# Patient Record
Sex: MALE | Race: WHITE | NOT HISPANIC OR LATINO | Employment: FULL TIME | URBAN - METROPOLITAN AREA
[De-identification: names, ages, dates, MRNs, and addresses within clinical notes are randomized per-mention and may not be internally consistent; named-entity substitution may affect disease eponyms.]

---

## 2023-12-22 ENCOUNTER — TELEPHONE (OUTPATIENT)
Dept: PRIMARY CARE | Facility: CLINIC | Age: 43
End: 2023-12-22
Payer: COMMERCIAL

## 2023-12-27 NOTE — TELEPHONE ENCOUNTER
Patient has not been seen in over 3 years, so I spoke to his wife and she was going to check and see if he has been being seen elsewhere more recently since he has been taking the levothyroxine.

## 2024-05-15 ENCOUNTER — OFFICE VISIT (OUTPATIENT)
Dept: PRIMARY CARE | Facility: CLINIC | Age: 44
End: 2024-05-15
Payer: COMMERCIAL

## 2024-05-15 VITALS
BODY MASS INDEX: 37.41 KG/M2 | HEART RATE: 74 BPM | WEIGHT: 224.8 LBS | DIASTOLIC BLOOD PRESSURE: 104 MMHG | TEMPERATURE: 98 F | OXYGEN SATURATION: 97 % | SYSTOLIC BLOOD PRESSURE: 160 MMHG

## 2024-05-15 DIAGNOSIS — E66.01 CLASS 2 SEVERE OBESITY DUE TO EXCESS CALORIES WITH SERIOUS COMORBIDITY AND BODY MASS INDEX (BMI) OF 37.0 TO 37.9 IN ADULT (MULTI): ICD-10-CM

## 2024-05-15 DIAGNOSIS — I15.9 SECONDARY HYPERTENSION: ICD-10-CM

## 2024-05-15 DIAGNOSIS — E03.9 HYPOTHYROIDISM, UNSPECIFIED TYPE: ICD-10-CM

## 2024-05-15 DIAGNOSIS — Z00.00 ANNUAL PHYSICAL EXAM: Primary | ICD-10-CM

## 2024-05-15 DIAGNOSIS — F41.9 ANXIETY: ICD-10-CM

## 2024-05-15 DIAGNOSIS — E55.9 VITAMIN D DEFICIENCY: ICD-10-CM

## 2024-05-15 DIAGNOSIS — J30.9 ALLERGIC RHINITIS, UNSPECIFIED SEASONALITY, UNSPECIFIED TRIGGER: ICD-10-CM

## 2024-05-15 PROBLEM — I10 HYPERTENSION: Status: ACTIVE | Noted: 2019-02-11

## 2024-05-15 PROCEDURE — 99386 PREV VISIT NEW AGE 40-64: CPT | Performed by: INTERNAL MEDICINE

## 2024-05-15 PROCEDURE — 3080F DIAST BP >= 90 MM HG: CPT | Performed by: INTERNAL MEDICINE

## 2024-05-15 PROCEDURE — 99214 OFFICE O/P EST MOD 30 MIN: CPT | Performed by: INTERNAL MEDICINE

## 2024-05-15 PROCEDURE — 1036F TOBACCO NON-USER: CPT | Performed by: INTERNAL MEDICINE

## 2024-05-15 PROCEDURE — 3077F SYST BP >= 140 MM HG: CPT | Performed by: INTERNAL MEDICINE

## 2024-05-15 RX ORDER — AMLODIPINE BESYLATE 10 MG/1
10 TABLET ORAL DAILY
Qty: 30 TABLET | Refills: 0 | Status: SHIPPED | OUTPATIENT
Start: 2024-05-15 | End: 2024-05-15

## 2024-05-15 RX ORDER — AMLODIPINE BESYLATE 5 MG/1
5 TABLET ORAL DAILY
COMMUNITY
End: 2024-05-15 | Stop reason: SDUPTHER

## 2024-05-15 RX ORDER — LEVOTHYROXINE SODIUM 25 UG/1
25 TABLET ORAL DAILY
Qty: 90 TABLET | Refills: 0 | Status: SHIPPED | OUTPATIENT
Start: 2024-05-15

## 2024-05-15 RX ORDER — BUPROPION HYDROCHLORIDE 150 MG/1
150 TABLET, EXTENDED RELEASE ORAL 2 TIMES DAILY
Qty: 180 TABLET | Refills: 0 | Status: SHIPPED | OUTPATIENT
Start: 2024-05-15

## 2024-05-15 RX ORDER — BUPROPION HYDROCHLORIDE 150 MG/1
150 TABLET, EXTENDED RELEASE ORAL 2 TIMES DAILY
COMMUNITY
End: 2024-05-15 | Stop reason: SDUPTHER

## 2024-05-15 RX ORDER — FLUTICASONE PROPIONATE 50 MCG
1 SPRAY, SUSPENSION (ML) NASAL DAILY
Qty: 16 G | Refills: 2 | Status: SHIPPED | OUTPATIENT
Start: 2024-05-15 | End: 2024-06-08 | Stop reason: SDUPTHER

## 2024-05-15 RX ORDER — AMLODIPINE BESYLATE 5 MG/1
5 TABLET ORAL DAILY
Qty: 90 TABLET | Refills: 0 | Status: SHIPPED | OUTPATIENT
Start: 2024-05-15 | End: 2024-05-15

## 2024-05-15 RX ORDER — LISINOPRIL 5 MG/1
5 TABLET ORAL DAILY
COMMUNITY
End: 2024-05-15 | Stop reason: SDUPTHER

## 2024-05-15 RX ORDER — AMLODIPINE BESYLATE 10 MG/1
10 TABLET ORAL DAILY
Qty: 90 TABLET | Refills: 0 | Status: SHIPPED | OUTPATIENT
Start: 2024-05-15

## 2024-05-15 RX ORDER — LEVOTHYROXINE SODIUM 25 UG/1
TABLET ORAL
COMMUNITY
Start: 2022-12-22 | End: 2024-05-15 | Stop reason: SDUPTHER

## 2024-05-15 RX ORDER — LISINOPRIL 5 MG/1
5 TABLET ORAL DAILY
Qty: 90 TABLET | Refills: 0 | Status: SHIPPED | OUTPATIENT
Start: 2024-05-15 | End: 2024-05-20 | Stop reason: SDUPTHER

## 2024-05-15 ASSESSMENT — ENCOUNTER SYMPTOMS: HYPERTENSION: 1

## 2024-05-15 ASSESSMENT — PATIENT HEALTH QUESTIONNAIRE - PHQ9
SUM OF ALL RESPONSES TO PHQ9 QUESTIONS 1 AND 2: 0
1. LITTLE INTEREST OR PLEASURE IN DOING THINGS: NOT AT ALL
2. FEELING DOWN, DEPRESSED OR HOPELESS: NOT AT ALL

## 2024-05-15 NOTE — PROGRESS NOTES
Subjective   Patient ID: Francis Herrera is a 44 y.o. male who presents for New Patient Visit, Hypertension, Hypothyroidism, Anxiety, and testosterone (Discuss) / follow up  Hypertension  Associated symptoms include anxiety.   Anxiety        New PCP    Yearly physical    Prostate none  Colonoscopy none  CT chest lung cancer screening none  immunizations rev'd  BMI 37.4    Follow up    Hypertension uncontrolled  Increase amlodipine to 10 mg daily  Follow closely    Hypothyroidism due to Hashimoto's  Continue meds    SHAJI stable on therapy no side effects    Allergic rhinitis stable on therapy no side effects    Diet and exercise reviewed    Review of Systems   All other systems reviewed and are negative.      Objective   BP (!) 160/104   Pulse 74   Temp 36.7 °C (98 °F)   Wt 102 kg (224 lb 12.8 oz)   SpO2 97%   BMI 37.41 kg/m²   Lab Results   Component Value Date    WBC 9.3 02/25/2019    HGB 16.6 (H) 02/25/2019    HCT 48.4 02/25/2019     02/25/2019    CHOL 222 (H) 02/23/2019    TRIG 265 (H) 02/23/2019    HDL 30 (L) 02/23/2019    ALT 57 (H) 02/23/2019    AST 39 02/23/2019     08/05/2019    K 3.8 08/05/2019     08/05/2019    CREATININE 1.3 08/05/2019    BUN 15 08/05/2019    CO2 26 08/05/2019    TSH 4.05 08/05/2019    HGBA1C 5.1 12/17/2022           Physical Exam  Vitals reviewed.   Constitutional:       Appearance: Normal appearance. He is obese.   HENT:      Head: Normocephalic and atraumatic.      Mouth/Throat:      Pharynx: No posterior oropharyngeal erythema.   Eyes:      General: No scleral icterus.     Conjunctiva/sclera: Conjunctivae normal.      Pupils: Pupils are equal, round, and reactive to light.   Cardiovascular:      Rate and Rhythm: Normal rate and regular rhythm.      Heart sounds: Normal heart sounds.   Pulmonary:      Effort: No respiratory distress.      Breath sounds: No wheezing.   Abdominal:      General: Abdomen is flat. Bowel sounds are normal. There is no distension.       Palpations: Abdomen is soft. There is no mass.      Tenderness: There is no abdominal tenderness. There is no rebound.   Musculoskeletal:         General: Normal range of motion.      Cervical back: Normal range of motion and neck supple.   Skin:     General: Skin is warm and dry.   Neurological:      General: No focal deficit present.      Mental Status: He is alert and oriented to person, place, and time. Mental status is at baseline.   Psychiatric:         Mood and Affect: Mood normal.         Behavior: Behavior normal.         Thought Content: Thought content normal.         Judgment: Judgment normal.         Problem List Items Addressed This Visit             ICD-10-CM    Hypothyroid E03.9    Relevant Medications    levothyroxine (Synthroid, Levoxyl) 25 mcg tablet    Hypertension I10    Relevant Medications    lisinopril 5 mg tablet     Other Visit Diagnoses         Codes    Annual physical exam    -  Primary Z00.00    Relevant Orders    CBC and Auto Differential    Comprehensive Metabolic Panel    Lipid Panel    TSH with reflex to Free T4 if abnormal    Anxiety     F41.9    Relevant Medications    buPROPion SR (Wellbutrin SR) 150 mg 12 hr tablet    Vitamin D deficiency     E55.9    Relevant Orders    Vitamin D 25-Hydroxy,Total (for eval of Vitamin D levels)    Allergic rhinitis, unspecified seasonality, unspecified trigger     J30.9    Relevant Medications    fluticasone (Flonase) 50 mcg/actuation nasal spray    Class 2 severe obesity due to excess calories with serious comorbidity and body mass index (BMI) of 37.0 to 37.9 in adult (Multi)     E66.01, Z68.37          Assessment/Plan     New PCP    Yearly physical    Prostate none  Colonoscopy none  CT chest lung cancer screening none  immunizations rev'd  BMI 37.4    Follow up    Hypertension uncontrolled  Increase amlodipine to 10 mg daily  Follow closely    Hypothyroidism due to Hashimoto's  Continue meds    SHAJI stable on therapy no side effects    Allergic  rhinitis stable on therapy no side effects    Diet and exercise reviewed    Check labs    Follow up 3 weeks / EKG

## 2024-05-20 ENCOUNTER — TELEPHONE (OUTPATIENT)
Dept: PRIMARY CARE | Facility: CLINIC | Age: 44
End: 2024-05-20
Payer: COMMERCIAL

## 2024-05-20 DIAGNOSIS — I15.9 SECONDARY HYPERTENSION: ICD-10-CM

## 2024-05-20 NOTE — TELEPHONE ENCOUNTER
Patients wife called and asked if dimas is supposed to take his lisinopril 5 mg with the 10 amlodipine. I saw in the last chart note that it said to increase amlodipine to 10. Please advise.

## 2024-05-21 RX ORDER — LISINOPRIL 5 MG/1
5 TABLET ORAL DAILY
Qty: 30 TABLET | Refills: 0 | Status: SHIPPED | OUTPATIENT
Start: 2024-05-21

## 2024-06-08 DIAGNOSIS — J30.9 ALLERGIC RHINITIS, UNSPECIFIED SEASONALITY, UNSPECIFIED TRIGGER: ICD-10-CM

## 2024-06-08 RX ORDER — FLUTICASONE PROPIONATE 50 MCG
SPRAY, SUSPENSION (ML) NASAL
Qty: 48 ML | Refills: 0 | Status: SHIPPED | OUTPATIENT
Start: 2024-06-08

## 2024-06-11 ENCOUNTER — APPOINTMENT (OUTPATIENT)
Dept: PRIMARY CARE | Facility: CLINIC | Age: 44
End: 2024-06-11
Payer: COMMERCIAL

## 2024-06-14 ENCOUNTER — LAB (OUTPATIENT)
Dept: LAB | Facility: LAB | Age: 44
End: 2024-06-14
Payer: COMMERCIAL

## 2024-06-14 DIAGNOSIS — E55.9 VITAMIN D DEFICIENCY: ICD-10-CM

## 2024-06-14 DIAGNOSIS — Z00.00 ANNUAL PHYSICAL EXAM: ICD-10-CM

## 2024-06-14 LAB
25(OH)D3 SERPL-MCNC: 34 NG/ML (ref 30–100)
ALBUMIN SERPL BCP-MCNC: 4.6 G/DL (ref 3.4–5)
ALP SERPL-CCNC: 68 U/L (ref 33–120)
ALT SERPL W P-5'-P-CCNC: 47 U/L (ref 10–52)
ANION GAP SERPL CALC-SCNC: 11 MMOL/L (ref 10–20)
AST SERPL W P-5'-P-CCNC: 31 U/L (ref 9–39)
BASOPHILS # BLD AUTO: 0.06 X10*3/UL (ref 0–0.1)
BASOPHILS NFR BLD AUTO: 0.7 %
BILIRUB SERPL-MCNC: 0.7 MG/DL (ref 0–1.2)
BUN SERPL-MCNC: 16 MG/DL (ref 6–23)
CALCIUM SERPL-MCNC: 9.3 MG/DL (ref 8.6–10.3)
CHLORIDE SERPL-SCNC: 101 MMOL/L (ref 98–107)
CHOLEST SERPL-MCNC: 258 MG/DL (ref 0–199)
CHOLESTEROL/HDL RATIO: 7.5
CO2 SERPL-SCNC: 31 MMOL/L (ref 21–32)
CREAT SERPL-MCNC: 1.58 MG/DL (ref 0.5–1.3)
EGFRCR SERPLBLD CKD-EPI 2021: 55 ML/MIN/1.73M*2
EOSINOPHIL # BLD AUTO: 0.4 X10*3/UL (ref 0–0.7)
EOSINOPHIL NFR BLD AUTO: 4.5 %
ERYTHROCYTE [DISTWIDTH] IN BLOOD BY AUTOMATED COUNT: 12.8 % (ref 11.5–14.5)
GLUCOSE SERPL-MCNC: 102 MG/DL (ref 74–99)
HCT VFR BLD AUTO: 49.4 % (ref 41–52)
HDLC SERPL-MCNC: 34.5 MG/DL
HGB BLD-MCNC: 16.6 G/DL (ref 13.5–17.5)
IMM GRANULOCYTES # BLD AUTO: 0.04 X10*3/UL (ref 0–0.7)
IMM GRANULOCYTES NFR BLD AUTO: 0.4 % (ref 0–0.9)
LDLC SERPL CALC-MCNC: 189 MG/DL
LYMPHOCYTES # BLD AUTO: 2 X10*3/UL (ref 1.2–4.8)
LYMPHOCYTES NFR BLD AUTO: 22.3 %
MCH RBC QN AUTO: 28.1 PG (ref 26–34)
MCHC RBC AUTO-ENTMCNC: 33.6 G/DL (ref 32–36)
MCV RBC AUTO: 84 FL (ref 80–100)
MONOCYTES # BLD AUTO: 0.67 X10*3/UL (ref 0.1–1)
MONOCYTES NFR BLD AUTO: 7.5 %
NEUTROPHILS # BLD AUTO: 5.79 X10*3/UL (ref 1.2–7.7)
NEUTROPHILS NFR BLD AUTO: 64.6 %
NON HDL CHOLESTEROL: 224 MG/DL (ref 0–149)
NRBC BLD-RTO: 0 /100 WBCS (ref 0–0)
PLATELET # BLD AUTO: 362 X10*3/UL (ref 150–450)
POTASSIUM SERPL-SCNC: 4.2 MMOL/L (ref 3.5–5.3)
PROT SERPL-MCNC: 7.6 G/DL (ref 6.4–8.2)
RBC # BLD AUTO: 5.9 X10*6/UL (ref 4.5–5.9)
SODIUM SERPL-SCNC: 139 MMOL/L (ref 136–145)
TRIGL SERPL-MCNC: 172 MG/DL (ref 0–149)
TSH SERPL-ACNC: 2.93 MIU/L (ref 0.44–3.98)
VLDL: 34 MG/DL (ref 0–40)
WBC # BLD AUTO: 9 X10*3/UL (ref 4.4–11.3)

## 2024-06-14 PROCEDURE — 82306 VITAMIN D 25 HYDROXY: CPT

## 2024-06-14 PROCEDURE — 36415 COLL VENOUS BLD VENIPUNCTURE: CPT

## 2024-07-10 ENCOUNTER — APPOINTMENT (OUTPATIENT)
Dept: PRIMARY CARE | Facility: CLINIC | Age: 44
End: 2024-07-10
Payer: COMMERCIAL

## 2024-07-11 ENCOUNTER — APPOINTMENT (OUTPATIENT)
Dept: LAB | Facility: LAB | Age: 44
End: 2024-07-11
Payer: COMMERCIAL

## 2024-07-11 ENCOUNTER — LAB (OUTPATIENT)
Dept: LAB | Facility: LAB | Age: 44
End: 2024-07-11
Payer: COMMERCIAL

## 2024-07-11 ENCOUNTER — OFFICE VISIT (OUTPATIENT)
Dept: PRIMARY CARE | Facility: CLINIC | Age: 44
End: 2024-07-11
Payer: COMMERCIAL

## 2024-07-11 ENCOUNTER — HOSPITAL ENCOUNTER (OUTPATIENT)
Dept: RADIOLOGY | Facility: HOSPITAL | Age: 44
Discharge: HOME | End: 2024-07-11
Payer: COMMERCIAL

## 2024-07-11 VITALS
BODY MASS INDEX: 36.68 KG/M2 | SYSTOLIC BLOOD PRESSURE: 160 MMHG | TEMPERATURE: 98.3 F | WEIGHT: 220.4 LBS | OXYGEN SATURATION: 98 % | DIASTOLIC BLOOD PRESSURE: 92 MMHG | HEART RATE: 82 BPM

## 2024-07-11 DIAGNOSIS — R94.31 ABNORMAL ECG: ICD-10-CM

## 2024-07-11 DIAGNOSIS — R07.9 CHEST PAIN, UNSPECIFIED TYPE: ICD-10-CM

## 2024-07-11 DIAGNOSIS — Z82.49 FAMILY HISTORY OF CORONARY ARTERY DISEASE: ICD-10-CM

## 2024-07-11 DIAGNOSIS — E03.9 HYPOTHYROIDISM, UNSPECIFIED TYPE: ICD-10-CM

## 2024-07-11 DIAGNOSIS — Z71.2 ENCOUNTER TO DISCUSS TEST RESULTS: Primary | ICD-10-CM

## 2024-07-11 DIAGNOSIS — E66.01 CLASS 2 SEVERE OBESITY DUE TO EXCESS CALORIES WITH SERIOUS COMORBIDITY AND BODY MASS INDEX (BMI) OF 36.0 TO 36.9 IN ADULT (MULTI): ICD-10-CM

## 2024-07-11 DIAGNOSIS — E78.00 HYPERCHOLESTEROLEMIA: ICD-10-CM

## 2024-07-11 DIAGNOSIS — I15.9 SECONDARY HYPERTENSION: ICD-10-CM

## 2024-07-11 DIAGNOSIS — F41.9 ANXIETY: ICD-10-CM

## 2024-07-11 DIAGNOSIS — N18.31 STAGE 3A CHRONIC KIDNEY DISEASE (MULTI): ICD-10-CM

## 2024-07-11 LAB — CARDIAC TROPONIN I PNL SERPL HS: 4 NG/L (ref 0–20)

## 2024-07-11 PROCEDURE — 71250 CT THORAX DX C-: CPT

## 2024-07-11 PROCEDURE — 3077F SYST BP >= 140 MM HG: CPT | Performed by: INTERNAL MEDICINE

## 2024-07-11 PROCEDURE — 71250 CT THORAX DX C-: CPT | Performed by: STUDENT IN AN ORGANIZED HEALTH CARE EDUCATION/TRAINING PROGRAM

## 2024-07-11 PROCEDURE — 3080F DIAST BP >= 90 MM HG: CPT | Performed by: INTERNAL MEDICINE

## 2024-07-11 PROCEDURE — 93000 ELECTROCARDIOGRAM COMPLETE: CPT | Performed by: INTERNAL MEDICINE

## 2024-07-11 PROCEDURE — 36415 COLL VENOUS BLD VENIPUNCTURE: CPT

## 2024-07-11 PROCEDURE — 84484 ASSAY OF TROPONIN QUANT: CPT

## 2024-07-11 PROCEDURE — 99214 OFFICE O/P EST MOD 30 MIN: CPT | Performed by: INTERNAL MEDICINE

## 2024-07-11 RX ORDER — ATORVASTATIN CALCIUM 40 MG/1
40 TABLET, FILM COATED ORAL DAILY
Qty: 30 TABLET | Refills: 0 | Status: SHIPPED | OUTPATIENT
Start: 2024-07-11 | End: 2024-07-14

## 2024-07-11 RX ORDER — LISINOPRIL 20 MG/1
20 TABLET ORAL DAILY
Qty: 30 TABLET | Refills: 0 | Status: SHIPPED | OUTPATIENT
Start: 2024-07-11 | End: 2024-07-14

## 2024-07-11 NOTE — PROGRESS NOTES
Subjective   Patient ID: Francis Herrera is a 44 y.o. male who presents for 1 month follow up and Chest Pain (Sharp, shooting x 2 weeks- when doing any cardio activity.).  Chest Pain       Follow up labs    Patient complains of chest pain left sided radiating to left subscapular region x 2 weeks.  He describes the pain more as a tightness which worsens with deep breath.  It does resolve when patient rests.  He denied any nausea, diaphoresis, dyspnea  Unable to exercise on treadmill since symptoms.  EKG sinus rhythm 71 new incomplete right bundle branch block (in comparison to EKG from 2023)  Patient had been evaluated by cardiologist in 2023 through [facility] due to family history of CAD.  At that time patient was able to complete a negative stress test.  Check labs, CT chest stat  Cardio consult    Hypercholesterolemia  Patient has been eating poorly due to new smoker  Counseled  Start Lipitor 40 mg daily  Risk/benefits reviewed    Hypertension uncontrolled  Increase lisinopril to 20 mg daily  Follow closely    CKD stage 3a  GFR 58   6-24    Hypothyroidism due to Hashimoto's  Continue meds     SHAJI  on therapy no side effects  Feels increased stress from work load     Allergic rhinitis stable on therapy no side effects     Diet and exercise reviewed    Review of Systems   Cardiovascular:  Positive for chest pain.   All other systems reviewed and are negative.      Objective   BP (!) 160/92   Pulse 82   Temp 36.8 °C (98.3 °F)   Wt 100 kg (220 lb 6.4 oz)   SpO2 98%   BMI 36.68 kg/m²   Lab Results   Component Value Date    WBC 9.0 06/14/2024    HGB 16.6 06/14/2024    HCT 49.4 06/14/2024     06/14/2024    CHOL 258 (H) 06/14/2024    TRIG 172 (H) 06/14/2024    HDL 34.5 06/14/2024    ALT 47 06/14/2024    AST 31 06/14/2024     06/14/2024    K 4.2 06/14/2024     06/14/2024    CREATININE 1.58 (H) 06/14/2024    BUN 16 06/14/2024    CO2 31 06/14/2024    TSH 2.93 06/14/2024    HGBA1C 5.1 12/17/2022            Physical Exam  Vitals reviewed.   Constitutional:       Appearance: Normal appearance. He is obese.   HENT:      Head: Normocephalic and atraumatic.      Mouth/Throat:      Pharynx: No posterior oropharyngeal erythema.   Eyes:      General: No scleral icterus.     Conjunctiva/sclera: Conjunctivae normal.      Pupils: Pupils are equal, round, and reactive to light.   Cardiovascular:      Rate and Rhythm: Normal rate and regular rhythm.      Heart sounds: Normal heart sounds.   Pulmonary:      Effort: No respiratory distress.      Breath sounds: No wheezing.   Abdominal:      General: Abdomen is flat. Bowel sounds are normal. There is no distension.      Palpations: Abdomen is soft. There is no mass.      Tenderness: There is no abdominal tenderness. There is no rebound.   Musculoskeletal:         General: Normal range of motion.      Cervical back: Normal range of motion and neck supple.   Skin:     General: Skin is warm and dry.   Neurological:      General: No focal deficit present.      Mental Status: He is alert and oriented to person, place, and time. Mental status is at baseline.   Psychiatric:         Mood and Affect: Mood normal.         Behavior: Behavior normal.         Thought Content: Thought content normal.         Judgment: Judgment normal.         Problem List Items Addressed This Visit             ICD-10-CM    Hypothyroid E03.9    Hypertension I10    Relevant Medications    lisinopril 20 mg tablet    Other Relevant Orders    ECG 12 lead (Clinic Performed) (Completed)     Other Visit Diagnoses         Codes    Encounter to discuss test results    -  Primary Z71.2    Chest pain, unspecified type     R07.9    Relevant Orders    Referral to Cardiology    Troponin I, High Sensitivity (Completed)    CT chest wo IV contrast (Completed)    Abnormal ECG     R94.31    Relevant Orders    Referral to Cardiology    Hypercholesterolemia     E78.00    Relevant Medications    atorvastatin (Lipitor) 40 mg tablet     Stage 3a chronic kidney disease (Multi)     N18.31    Anxiety     F41.9    Family history of coronary artery disease     Z82.49    Relevant Orders    Referral to Cardiology    Class 2 severe obesity due to excess calories with serious comorbidity and body mass index (BMI) of 36.0 to 36.9 in adult (Multi)     E66.01, Z68.36          Assessment/Plan       Follow up labs    Patient complains of chest pain left sided radiating to left subscapular region x 2 weeks.  He describes the pain more as a tightness which worsens with deep breath.  It does resolve when patient rests.  He denied any nausea, diaphoresis, dyspnea  Unable to exercise on treadmill since symptoms.  EKG sinus rhythm 71 new incomplete right bundle branch block (in comparison to EKG from 2023)  Patient had been evaluated by cardiologist in 2023 through [facility] due to family history of CAD.  At that time patient was able to complete a negative stress test.  Check labs, CT chest stat  Cardio consult    Hypercholesterolemia  Patient has been eating poorly due to new smoker  Counseled  Start Lipitor 40 mg daily  Risk/benefits reviewed    Hypertension uncontrolled  Increase lisinopril to 20 mg daily  Follow closely    CKD stage 3a  GFR 58   6-24    Hypothyroidism due to Hashimoto's  Continue meds     SHAJI  on therapy no side effects  Feels increased stress from work load     Allergic rhinitis stable on therapy no side effects     Diet and exercise reviewed    Prostate none  Colonoscopy none  CT chest lung cancer screening none  immunizations rev'd  BMI 36.6    Follow up 1 month

## 2024-07-12 DIAGNOSIS — E78.00 HYPERCHOLESTEROLEMIA: ICD-10-CM

## 2024-07-12 DIAGNOSIS — I15.9 SECONDARY HYPERTENSION: ICD-10-CM

## 2024-07-14 RX ORDER — ATORVASTATIN CALCIUM 40 MG/1
40 TABLET, FILM COATED ORAL DAILY
Qty: 30 TABLET | Refills: 0 | Status: SHIPPED | OUTPATIENT
Start: 2024-07-14

## 2024-07-14 RX ORDER — LISINOPRIL 20 MG/1
20 TABLET ORAL DAILY
Qty: 30 TABLET | Refills: 0 | Status: SHIPPED | OUTPATIENT
Start: 2024-07-14

## 2024-07-17 ENCOUNTER — APPOINTMENT (OUTPATIENT)
Dept: PRIMARY CARE | Facility: CLINIC | Age: 44
End: 2024-07-17
Payer: COMMERCIAL

## 2024-08-07 DIAGNOSIS — E03.9 HYPOTHYROIDISM, UNSPECIFIED TYPE: ICD-10-CM

## 2024-08-07 RX ORDER — LEVOTHYROXINE SODIUM 25 UG/1
25 TABLET ORAL DAILY
Qty: 30 TABLET | Refills: 0 | Status: SHIPPED | OUTPATIENT
Start: 2024-08-07

## 2024-08-13 ENCOUNTER — APPOINTMENT (OUTPATIENT)
Dept: PRIMARY CARE | Facility: CLINIC | Age: 44
End: 2024-08-13
Payer: COMMERCIAL

## 2024-08-13 VITALS
DIASTOLIC BLOOD PRESSURE: 82 MMHG | TEMPERATURE: 98.7 F | BODY MASS INDEX: 36.38 KG/M2 | HEART RATE: 84 BPM | OXYGEN SATURATION: 97 % | SYSTOLIC BLOOD PRESSURE: 136 MMHG | WEIGHT: 218.6 LBS

## 2024-08-13 DIAGNOSIS — F41.9 ANXIETY: ICD-10-CM

## 2024-08-13 DIAGNOSIS — I10 HYPERTENSION, UNSPECIFIED TYPE: Primary | ICD-10-CM

## 2024-08-13 DIAGNOSIS — E03.9 HYPOTHYROIDISM, UNSPECIFIED TYPE: ICD-10-CM

## 2024-08-13 DIAGNOSIS — G47.33 OSA (OBSTRUCTIVE SLEEP APNEA): ICD-10-CM

## 2024-08-13 DIAGNOSIS — N18.31 STAGE 3A CHRONIC KIDNEY DISEASE (MULTI): ICD-10-CM

## 2024-08-13 DIAGNOSIS — E66.01 CLASS 2 SEVERE OBESITY DUE TO EXCESS CALORIES WITH SERIOUS COMORBIDITY AND BODY MASS INDEX (BMI) OF 36.0 TO 36.9 IN ADULT (MULTI): ICD-10-CM

## 2024-08-13 DIAGNOSIS — E78.00 HYPERCHOLESTEROLEMIA: ICD-10-CM

## 2024-08-13 PROCEDURE — 3075F SYST BP GE 130 - 139MM HG: CPT | Performed by: INTERNAL MEDICINE

## 2024-08-13 PROCEDURE — 3079F DIAST BP 80-89 MM HG: CPT | Performed by: INTERNAL MEDICINE

## 2024-08-13 PROCEDURE — 99214 OFFICE O/P EST MOD 30 MIN: CPT | Performed by: INTERNAL MEDICINE

## 2024-08-13 PROCEDURE — 1036F TOBACCO NON-USER: CPT | Performed by: INTERNAL MEDICINE

## 2024-08-13 RX ORDER — LISINOPRIL 20 MG/1
20 TABLET ORAL DAILY
Qty: 90 TABLET | Refills: 0 | Status: SHIPPED | OUTPATIENT
Start: 2024-08-13

## 2024-08-13 RX ORDER — LEVOTHYROXINE SODIUM 50 UG/1
50 TABLET ORAL DAILY
Qty: 30 TABLET | Refills: 1 | Status: SHIPPED | OUTPATIENT
Start: 2024-08-13

## 2024-08-13 NOTE — PROGRESS NOTES
Subjective   Patient ID: Francis Herrera is a 44 y.o. male who presents for 1 month follow up .  HPI    Follow up bp    Patient complains of chest pain left sided radiating to left subscapular region. He describes the pain more as a tightness which worsens with deep breath.  It does resolve when patient rests.  He denied any nausea, diaphoresis, dyspnea  Unable to exercise on treadmill since symptoms.  EKG sinus rhythm 71 new incomplete right bundle branch block (in comparison to EKG from 2023)  Patient had been evaluated by cardiologist in 2023 through [facility] due to family history of CAD.  At that time patient was able to complete a negative stress test.  Cardio following  Repeat stress test pending  Sleep study recommended     Hypercholesterolemia  Patient has been eating poorly due to new smoker  Counseled  Started Lipitor 40 mg daily     Hypertension better  On lisinopril to 20 mg daily     CKD stage 3a  GFR 58   6-24     Hypothyroidism due to Hashimoto's feels awful, achy  Increase thyroid 50 mcg daily  Plan recheck on follow up     SHAJI  on therapy no side effects  Feels increased stress from work load     Allergic rhinitis stable on therapy no side effects     Diet and exercise reviewed    Review of Systems   All other systems reviewed and are negative.      Objective   /82   Pulse 84   Temp 37.1 °C (98.7 °F)   Wt 99.2 kg (218 lb 9.6 oz)   SpO2 97%   BMI 36.38 kg/m²   Lab Results   Component Value Date    WBC 9.0 06/14/2024    HGB 16.6 06/14/2024    HCT 49.4 06/14/2024     06/14/2024    CHOL 258 (H) 06/14/2024    TRIG 172 (H) 06/14/2024    HDL 34.5 06/14/2024    ALT 47 06/14/2024    AST 31 06/14/2024     06/14/2024    K 4.2 06/14/2024     06/14/2024    CREATININE 1.58 (H) 06/14/2024    BUN 16 06/14/2024    CO2 31 06/14/2024    TSH 2.93 06/14/2024    HGBA1C 5.1 12/17/2022           Physical Exam  Vitals reviewed.   Constitutional:       Appearance: Normal appearance. He is obese.    HENT:      Head: Normocephalic and atraumatic.      Mouth/Throat:      Pharynx: No posterior oropharyngeal erythema.   Eyes:      General: No scleral icterus.     Conjunctiva/sclera: Conjunctivae normal.      Pupils: Pupils are equal, round, and reactive to light.   Cardiovascular:      Rate and Rhythm: Normal rate and regular rhythm.      Heart sounds: Normal heart sounds.   Pulmonary:      Effort: No respiratory distress.      Breath sounds: No wheezing.   Abdominal:      General: Abdomen is flat. Bowel sounds are normal. There is no distension.      Palpations: Abdomen is soft. There is no mass.      Tenderness: There is no abdominal tenderness. There is no rebound.   Musculoskeletal:         General: Normal range of motion.      Cervical back: Normal range of motion and neck supple.   Skin:     General: Skin is warm and dry.   Neurological:      General: No focal deficit present.      Mental Status: He is alert and oriented to person, place, and time. Mental status is at baseline.   Psychiatric:         Mood and Affect: Mood normal.         Behavior: Behavior normal.         Thought Content: Thought content normal.         Judgment: Judgment normal.         Problem List Items Addressed This Visit             ICD-10-CM    Hypothyroid E03.9    Relevant Medications    levothyroxine (Synthroid, Levoxyl) 50 mcg tablet    Hypertension - Primary I10    Relevant Medications    lisinopril 20 mg tablet     Other Visit Diagnoses         Codes    Hypercholesterolemia     E78.00    Stage 3a chronic kidney disease (Multi)     N18.31    HEIDI (obstructive sleep apnea)     G47.33    Relevant Orders    Referral to Adult Sleep Medicine    Anxiety     F41.9    Class 2 severe obesity due to excess calories with serious comorbidity and body mass index (BMI) of 36.0 to 36.9 in adult (Multi)     E66.01, Z68.36          Assessment/Plan     Follow up bp    Patient complains of chest pain left sided radiating to left subscapular region.  He describes the pain more as a tightness which worsens with deep breath.  It does resolve when patient rests.  He denied any nausea, diaphoresis, dyspnea  Unable to exercise on treadmill since symptoms.  EKG sinus rhythm 71 new incomplete right bundle branch block (in comparison to EKG from 2023)  Patient had been evaluated by cardiologist in 2023 through [facility] due to family history of CAD.  At that time patient was able to complete a negative stress test.  Cardio following  Repeat stress test pending  Sleep study recommended / ordered     Hypercholesterolemia  Patient has been eating poorly due to new smoker  Counseled  Started Lipitor 40 mg daily     Hypertension better  On lisinopril to 20 mg daily     CKD stage 3a  GFR 58   6-24     Hypothyroidism due to Hashimoto's feels awful, achy  Increase thyroid 50 mcg daily  Plan recheck on follow up     SHAJI  on therapy no side effects  Feels increased stress from work load     Allergic rhinitis stable on therapy no side effects     Diet and exercise reviewed    Prostate none  Colonoscopy none  CT chest lung cancer screening none  immunizations rev'd  BMI 36.3    Follow up 6 weeks

## 2024-08-30 ENCOUNTER — APPOINTMENT (OUTPATIENT)
Dept: SLEEP MEDICINE | Facility: CLINIC | Age: 44
End: 2024-08-30
Payer: COMMERCIAL

## 2024-08-30 VITALS
SYSTOLIC BLOOD PRESSURE: 151 MMHG | BODY MASS INDEX: 36.41 KG/M2 | HEART RATE: 72 BPM | WEIGHT: 218.8 LBS | OXYGEN SATURATION: 96 % | DIASTOLIC BLOOD PRESSURE: 92 MMHG

## 2024-08-30 DIAGNOSIS — R25.9 ABNORMAL MOVEMENTS: ICD-10-CM

## 2024-08-30 DIAGNOSIS — G47.9 SLEEP DISTURBANCE: ICD-10-CM

## 2024-08-30 DIAGNOSIS — I10 HYPERTENSION, UNSPECIFIED TYPE: ICD-10-CM

## 2024-08-30 DIAGNOSIS — E66.09 OBESITY DUE TO EXCESS CALORIES, UNSPECIFIED CLASSIFICATION, UNSPECIFIED WHETHER SERIOUS COMORBIDITY PRESENT: ICD-10-CM

## 2024-08-30 DIAGNOSIS — G47.30 SLEEP-DISORDERED BREATHING: Primary | ICD-10-CM

## 2024-08-30 DIAGNOSIS — J35.1 ENLARGED TONSILS: ICD-10-CM

## 2024-08-30 DIAGNOSIS — I45.10 INCOMPLETE RIGHT BUNDLE BRANCH BLOCK: ICD-10-CM

## 2024-08-30 DIAGNOSIS — R04.2 HEMOPTYSIS: ICD-10-CM

## 2024-08-30 DIAGNOSIS — G47.19 EXCESSIVE DAYTIME SLEEPINESS: ICD-10-CM

## 2024-08-30 PROCEDURE — 1036F TOBACCO NON-USER: CPT | Performed by: PSYCHIATRY & NEUROLOGY

## 2024-08-30 PROCEDURE — 3077F SYST BP >= 140 MM HG: CPT | Performed by: PSYCHIATRY & NEUROLOGY

## 2024-08-30 PROCEDURE — 99205 OFFICE O/P NEW HI 60 MIN: CPT | Performed by: PSYCHIATRY & NEUROLOGY

## 2024-08-30 PROCEDURE — 3080F DIAST BP >= 90 MM HG: CPT | Performed by: PSYCHIATRY & NEUROLOGY

## 2024-08-30 ASSESSMENT — SLEEP AND FATIGUE QUESTIONNAIRES
SITING INACTIVE IN A PUBLIC PLACE LIKE A CLASS ROOM OR A MOVIE THEATER: HIGH CHANCE OF DOZING
HOW LIKELY ARE YOU TO NOD OFF OR FALL ASLEEP WHILE LYING DOWN TO REST IN THE AFTERNOON WHEN CIRCUMSTANCES PERMIT: HIGH CHANCE OF DOZING
HOW LIKELY ARE YOU TO NOD OFF OR FALL ASLEEP WHEN YOU ARE A PASSENGER IN A CAR FOR AN HOUR WITHOUT A BREAK: MODERATE CHANCE OF DOZING
HOW LIKELY ARE YOU TO NOD OFF OR FALL ASLEEP WHILE WATCHING TV: HIGH CHANCE OF DOZING
HOW LIKELY ARE YOU TO NOD OFF OR FALL ASLEEP WHILE SITTING QUIETLY AFTER LUNCH WITHOUT ALCOHOL: MODERATE CHANCE OF DOZING
HOW LIKELY ARE YOU TO NOD OFF OR FALL ASLEEP WHILE SITTING AND READING: HIGH CHANCE OF DOZING
HOW LIKELY ARE YOU TO NOD OFF OR FALL ASLEEP WHILE SITTING AND TALKING TO SOMEONE: WOULD NEVER DOZE
HOW LIKELY ARE YOU TO NOD OFF OR FALL ASLEEP IN A CAR, WHILE STOPPED FOR A FEW MINUTES IN TRAFFIC: WOULD NEVER DOZE
ESS-CHAD TOTAL SCORE: 16

## 2024-08-30 NOTE — PROGRESS NOTES
" Patient: Francis Herrera    [id number]  : 1980 -- AGE 44 y.o.    Provider: Rowdy Reynolds MD     [facility]   Service Date: 2024              [facility]  New Visit Note      The patient's referring provider is: Octavia Lam MD      HPI:  Francis Herrera is a 44 y.o. male with HTN, allergic rhinitis, hypothyroidism, CKD-3a, incomplete RBBB, HLD, generalized anxiety, and ADHD, who presents today as a referral from his PCP for rule out sleep apnea.    Patient was recently found to have new incomplete right bundle branch block.    Complaint is \"I'm up a million times at night\" in the last 3-5 years. Has always been a light sleeper.    Sometimes wears a sleep wearable (watch) that says he gets only 8 minutes of REM sleep at night.    \"Always exhausted\".    NIGHTTIME SYMPTOMS:   Snoring: yes, nightly, loud, for his \"entire life\", worse when supine. Sleeps hugging a pillow to help keep him sleeping on his side. Very bothersome snoring to his wife. **Has woken up with blood in his throat a few days/week** - nearly daily exposure to chemicals, but wears a mask. When snoring he has a very dry mouth.  Witnessed apnea: yes - up to a minute and sometimes wife shakes him to make sure he is okay  Nocturnal gasping: not that he knows of  Nocturnal choking: not that he knows of  Sleep walking: No  Sleep talking:  No  Dream enactment: No  Bruxism: yes, no longer sleeping with mouthguard due to sensitive gag reflex  Nocturnal excessive sweating: occasional  Nocturnal GERD: occasional  Morning headaches: frequent  Morning dry mouth/sore throat: frequent  Nocturia: several times/night  Restless sleep: yes  Sleep paralysis: No  Hypnagogic/hypnopompic hallucinations: No  Bedroom environment is conducive to sleep: Yes    DAYTIME SYMPTOMS  [place]: 1624  Daytime sleepiness: \"all the time\". Always exhausted. Falls asleep as soon as he sits down on the couch or at work at " lunch.  Fatigue: Yes, and brain fog  Trouble with memory/concentration: occasional  Feeling sleepy while driving: Denies    Movements: a few times per day every few days he has a twitch/tic - arm, leg, neck, shoulder, whole body. Not disruptive to him. Many years of this.    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 No longer exercising as much as he was.    RLS symptoms: likes to always move his legs due to generalized anxiety/antsy feeling to burn off energy, but no leg discomfort. Occurs all the time, not only at night. A life long issue  Bed partner mentions pt kicks in sleep: No    Cataplexy: no    SLEEP HABITS:   Self-described morning person  Preferred sleep position: side or prone  Occasional CBD or Sativa for sleep onset  Bedtime: 8 pm on weekdays, stares at ceiling until 10 pm; bedtime 10 pm on weekends   Wake time: 4 am. Cannot sleep later than this  Napping: no  Total estimated sleep per 24 hrs: up to 4 hours    PRIOR SLEEP STUDIES:  None    PRIOR TREATMENTS:  Adderall and Ritalin for ADHD in the past    Patient Active Problem List   Diagnosis    Hypothyroid    Hypertension     Past Medical History:   Diagnosis Date    Anxiety     Hypertension     Hypothyroidism      Past Surgical History:   Procedure Laterality Date    VASECTOMY       Current Outpatient Medications   Medication Sig Dispense Refill    amLODIPine (Norvasc) 10 mg tablet TAKE 1 TABLET BY MOUTH EVERY DAY 90 tablet 0    atorvastatin (Lipitor) 40 mg tablet TAKE 1 TABLET BY MOUTH EVERY DAY 30 tablet 0    buPROPion SR (Wellbutrin SR) 150 mg 12 hr tablet Take 1 tablet (150 mg) by mouth 2 times a day. 180 tablet 0    fluticasone (Flonase) 50 mcg/actuation nasal spray 1 SPRAY INTO EACH NOSTRIL ONCE DAILY SHAKE GENTLY BEFORE FIRST USE PRIME PUMP. 48 mL 0    levothyroxine (Synthroid, Levoxyl) 50 mcg  tablet Take 1 tablet (50 mcg) by mouth early in the morning.. 30 tablet 1    lisinopril 20 mg tablet Take 1 tablet (20 mg) by mouth once daily. 90 tablet 0     No current facility-administered medications for this visit.     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 Occasional tea. Coffee Saturday.  Exercises 2x/week     ROS: 12 point ROS positive for fatigue, blurred vision, nasal congestion, sinus problems, SOB with exercise, cough, chest pain (resolved), headaches, abnormal movements as above, dizzy/lightheaded, joint/muscle pains, anxiety, and depressed mood, but no SI/HI. All other items/systems are negative.    PHYSICAL EXAMINATION:   Vitals:    24 1633   BP: (!) 151/92   BP Location: Left arm   Patient Position: Sitting   BP Cuff Size: Adult   Pulse: 72   SpO2: 96%   Weight: 99.2 kg (218 lb 12.8 oz)   Pt did not take his BP pills today  Body mass index is 36.41 kg/m².  General: Awake. Alert. Comfortable. No apparent distress. Appears fatigued  Speech: Normal  Comprehension: Normal  Mood: Stable  Affect: Appropriate  Eyes:   Eyelids: normal            ENT:          Nares are patent bilaterally. Septum deviation absent. Thompson tongue position IV. Tongue scalloping is present, tongue is enlarged, soft palate is not elongated, hard palate is not high arched. Uvula is mildly enlarged. Retrognathia is not present. Tonsils are  2-3+ on the right and 3-4+ on the left .  "Dentition very good. No visible lesions or blood in his oral cavity.           Neck:          Circumference: increased in caliber  Cardiac: Regular in rate and rhythm. No murmurs.  No edema in bilateral lower extremities  Pul:         Clear to auscultation bilaterally. Normal respiratory effort   Abd:         obese  Neuro: Alert, well-oriented. Cranial nerves II-XII grossly normal and symmetric.  Moves all limbs symmetrically with no evidence of significant focal weakness. No abnormal movements noted. Normal gait        LABS/DIAGNOSTICS:  Lab Results   Component Value Date    HGB 16.6 06/14/2024    CO2 31 06/14/2024    TSH 2.93 06/14/2024    FREET4 1.1 08/05/2019    HGBA1C 5.1 12/17/2022    VITD25 34 06/14/2024        Echo: with Dr. Mckenna 2 days ago - no results in system  Stress test: pending  CT chest 7/11/24: Per report: \"No acute cardiopulmonary process. Specifically, no pulmonary consolidations, pneumothoraces, or rib fractures identified.\"    ASSESSMENT AND PLAN: Mr. Francis Herrera is a 44 y.o. male with a history of years of loud snoring, very fragmented sleep, excessive daytime sleepiness/fatigue, and was recently found to have incomplete RBBB, who has large tonsils and probably has HEIDI. Hemoptysis may be from snoring and/or chemical exposure.      #sleep disordered breathing  -We discussed the risk factors for sleep apnea, pathophysiology of sleep apnea, treatment options, and potential long-term complications of untreated HEIDI, including cardiovascular and metabolic complications. We will start evaluation with a home sleep test.   -Patient was advised to consider employing positional therapy to avoid supine sleep to reduce sleep disordered breathing    #sleep disturbance  -home sleep study    #excessive daytime sleepiness - probable HEIDI  -home sleep study    #incomplete right bundle branch block  -home sleep study    #enlarged tonsils  -ENT referral    #hemoptysis - chemical exposures (inhaled) at work; " snoring  -ENT referral    #obesity  -weight loss encouraged    #abnormal movements - pt likely describing benign tics; he is not bothered by this  -nothing to do at this time    #hypertension - pt did not take his meds today  -management per PCP    All of the above was discussed with the patient in detail. He voiced an understanding of the above and was agreeable to proceed further as advised. Procedure for the sleep study was discussed with him.    60 minutes were spent on this encounter, including time reviewing the chart, conducting the H&P, counseling the patient, and documenting/placing orders.    FOLLOW UP:  After study to discuss results

## 2024-09-01 DIAGNOSIS — E78.00 HYPERCHOLESTEROLEMIA: ICD-10-CM

## 2024-09-02 RX ORDER — ATORVASTATIN CALCIUM 40 MG/1
40 TABLET, FILM COATED ORAL DAILY
Qty: 30 TABLET | Refills: 0 | Status: SHIPPED | OUTPATIENT
Start: 2024-09-02 | End: 2024-10-02

## 2024-09-07 ENCOUNTER — PROCEDURE VISIT (OUTPATIENT)
Dept: SLEEP MEDICINE | Facility: HOSPITAL | Age: 44
End: 2024-09-07
Payer: COMMERCIAL

## 2024-09-07 DIAGNOSIS — I45.10 INCOMPLETE RIGHT BUNDLE BRANCH BLOCK: ICD-10-CM

## 2024-09-07 DIAGNOSIS — G47.19 EXCESSIVE DAYTIME SLEEPINESS: ICD-10-CM

## 2024-09-07 DIAGNOSIS — G47.30 SLEEP-DISORDERED BREATHING: ICD-10-CM

## 2024-09-07 DIAGNOSIS — G47.9 SLEEP DISTURBANCE: ICD-10-CM

## 2024-09-07 PROCEDURE — 95806 SLEEP STUDY UNATT&RESP EFFT: CPT | Performed by: PSYCHIATRY & NEUROLOGY

## 2024-09-15 DIAGNOSIS — J30.9 ALLERGIC RHINITIS, UNSPECIFIED SEASONALITY, UNSPECIFIED TRIGGER: ICD-10-CM

## 2024-09-16 DIAGNOSIS — E78.00 HYPERCHOLESTEROLEMIA: ICD-10-CM

## 2024-09-16 RX ORDER — FLUTICASONE PROPIONATE 50 MCG
SPRAY, SUSPENSION (ML) NASAL
Qty: 32 ML | Refills: 0 | Status: SHIPPED | OUTPATIENT
Start: 2024-09-16 | End: 2024-09-17 | Stop reason: SDUPTHER

## 2024-09-16 RX ORDER — ATORVASTATIN CALCIUM 40 MG/1
TABLET, FILM COATED ORAL
Qty: 90 TABLET | Refills: 0 | Status: SHIPPED | OUTPATIENT
Start: 2024-09-16 | End: 2024-09-17 | Stop reason: SDUPTHER

## 2024-09-17 DIAGNOSIS — F41.9 ANXIETY: ICD-10-CM

## 2024-09-17 DIAGNOSIS — J30.9 ALLERGIC RHINITIS, UNSPECIFIED SEASONALITY, UNSPECIFIED TRIGGER: ICD-10-CM

## 2024-09-17 DIAGNOSIS — E78.00 HYPERCHOLESTEROLEMIA: ICD-10-CM

## 2024-09-17 RX ORDER — ATORVASTATIN CALCIUM 40 MG/1
40 TABLET, FILM COATED ORAL DAILY
Qty: 30 TABLET | Refills: 0 | Status: SHIPPED | OUTPATIENT
Start: 2024-09-17 | End: 2024-10-17

## 2024-09-17 RX ORDER — BUPROPION HYDROCHLORIDE 150 MG/1
150 TABLET, EXTENDED RELEASE ORAL 2 TIMES DAILY
Qty: 60 TABLET | Refills: 0 | Status: SHIPPED | OUTPATIENT
Start: 2024-09-17

## 2024-09-17 RX ORDER — FLUTICASONE PROPIONATE 50 MCG
1 SPRAY, SUSPENSION (ML) NASAL DAILY
Qty: 32 ML | Refills: 0 | Status: SHIPPED | OUTPATIENT
Start: 2024-09-17

## 2024-09-24 DIAGNOSIS — E78.00 HYPERCHOLESTEROLEMIA: ICD-10-CM

## 2024-09-24 RX ORDER — ATORVASTATIN CALCIUM 40 MG/1
40 TABLET, FILM COATED ORAL DAILY
Qty: 30 TABLET | Refills: 0 | OUTPATIENT
Start: 2024-09-24 | End: 2024-10-24

## 2024-09-26 DIAGNOSIS — E78.00 HYPERCHOLESTEROLEMIA: ICD-10-CM

## 2024-09-26 RX ORDER — ATORVASTATIN CALCIUM 40 MG/1
40 TABLET, FILM COATED ORAL DAILY
Qty: 90 TABLET | Refills: 0 | OUTPATIENT
Start: 2024-09-26 | End: 2024-12-25

## 2024-10-01 ENCOUNTER — APPOINTMENT (OUTPATIENT)
Dept: PRIMARY CARE | Facility: CLINIC | Age: 44
End: 2024-10-01
Payer: COMMERCIAL

## 2024-10-01 VITALS
DIASTOLIC BLOOD PRESSURE: 98 MMHG | OXYGEN SATURATION: 98 % | BODY MASS INDEX: 36.91 KG/M2 | WEIGHT: 221.8 LBS | HEART RATE: 77 BPM | SYSTOLIC BLOOD PRESSURE: 138 MMHG | TEMPERATURE: 98.5 F

## 2024-10-01 DIAGNOSIS — I10 HYPERTENSION, UNSPECIFIED TYPE: ICD-10-CM

## 2024-10-01 DIAGNOSIS — R53.83 FATIGUE, UNSPECIFIED TYPE: Primary | ICD-10-CM

## 2024-10-01 DIAGNOSIS — F41.9 ANXIETY: ICD-10-CM

## 2024-10-01 DIAGNOSIS — E66.812 CLASS 2 SEVERE OBESITY DUE TO EXCESS CALORIES WITH SERIOUS COMORBIDITY AND BODY MASS INDEX (BMI) OF 36.0 TO 36.9 IN ADULT: ICD-10-CM

## 2024-10-01 DIAGNOSIS — N18.31 STAGE 3A CHRONIC KIDNEY DISEASE (MULTI): ICD-10-CM

## 2024-10-01 DIAGNOSIS — E03.9 HYPOTHYROIDISM, UNSPECIFIED TYPE: ICD-10-CM

## 2024-10-01 DIAGNOSIS — E66.01 CLASS 2 SEVERE OBESITY DUE TO EXCESS CALORIES WITH SERIOUS COMORBIDITY AND BODY MASS INDEX (BMI) OF 36.0 TO 36.9 IN ADULT: ICD-10-CM

## 2024-10-01 DIAGNOSIS — E78.00 HYPERCHOLESTEROLEMIA: ICD-10-CM

## 2024-10-01 DIAGNOSIS — G47.33 OSA (OBSTRUCTIVE SLEEP APNEA): ICD-10-CM

## 2024-10-01 PROCEDURE — 99214 OFFICE O/P EST MOD 30 MIN: CPT | Performed by: INTERNAL MEDICINE

## 2024-10-01 PROCEDURE — 3080F DIAST BP >= 90 MM HG: CPT | Performed by: INTERNAL MEDICINE

## 2024-10-01 PROCEDURE — 3075F SYST BP GE 130 - 139MM HG: CPT | Performed by: INTERNAL MEDICINE

## 2024-10-01 PROCEDURE — 1036F TOBACCO NON-USER: CPT | Performed by: INTERNAL MEDICINE

## 2024-10-01 RX ORDER — ATORVASTATIN CALCIUM 40 MG/1
40 TABLET, FILM COATED ORAL DAILY
Qty: 90 TABLET | Refills: 0 | Status: SHIPPED | OUTPATIENT
Start: 2024-10-01 | End: 2024-12-30

## 2024-10-01 NOTE — PROGRESS NOTES
Subjective   Patient ID: Francis Herrera is a 44 y.o. male who presents for Follow-up (1 month ).  HPI    Follow up    Patient complains of chest pain left sided radiating to left subscapular region resolved. He describes the pain more as a tightness which worsens with deep breath.  It does resolve when patient rests.  He denied any nausea, diaphoresis, dyspnea  Unable to exercise on treadmill since symptoms.  EKG sinus rhythm 71 new incomplete right bundle branch block (in comparison to EKG from 2023)  Patient had been evaluated by cardiologist in 2023 through [facility] due to family history of CAD.  At that time patient was able to complete a negative stress test.  Cardio following  stress test 9-24 negative   Sleep study recommended / ordered     Hypercholesterolemia  Patient has been eating poorly   Counseled  On Lipitor 40 mg daily no side effects     Hypertension has been ok today took old meds since he didn't have current meds  On lisinopril to 20 mg daily  follow     CKD stage 3a  GFR 58   6-24     Hypothyroidism due to Hashimoto's feels better  On thyroid 50 mcg daily     SHAJI  on therapy no side effects  Feels less stress from work load     Allergic rhinitis stable on therapy no side effects     Diet and exercise reviewed     Review of Systems   All other systems reviewed and are negative.      Objective   BP (!) 138/98   Pulse 77   Temp 36.9 °C (98.5 °F)   Wt 101 kg (221 lb 12.8 oz)   SpO2 98%   BMI 36.91 kg/m²   Lab Results   Component Value Date    WBC 9.0 06/14/2024    HGB 16.6 06/14/2024    HCT 49.4 06/14/2024     06/14/2024    CHOL 258 (H) 06/14/2024    TRIG 172 (H) 06/14/2024    HDL 34.5 06/14/2024    ALT 47 06/14/2024    AST 31 06/14/2024     06/14/2024    K 4.2 06/14/2024     06/14/2024    CREATININE 1.58 (H) 06/14/2024    BUN 16 06/14/2024    CO2 31 06/14/2024    TSH 2.93 06/14/2024    HGBA1C 5.1 12/17/2022           Physical Exam  Vitals reviewed.   Constitutional:        Appearance: Normal appearance. He is obese.   HENT:      Head: Normocephalic and atraumatic.      Mouth/Throat:      Pharynx: No posterior oropharyngeal erythema.   Eyes:      General: No scleral icterus.     Conjunctiva/sclera: Conjunctivae normal.      Pupils: Pupils are equal, round, and reactive to light.   Cardiovascular:      Rate and Rhythm: Normal rate and regular rhythm.      Heart sounds: Normal heart sounds.   Pulmonary:      Effort: No respiratory distress.      Breath sounds: No wheezing.   Abdominal:      General: Abdomen is flat. Bowel sounds are normal. There is no distension.      Palpations: Abdomen is soft. There is no mass.      Tenderness: There is no abdominal tenderness. There is no rebound.   Musculoskeletal:         General: Normal range of motion.      Cervical back: Normal range of motion and neck supple.   Skin:     General: Skin is warm and dry.   Neurological:      General: No focal deficit present.      Mental Status: He is alert and oriented to person, place, and time. Mental status is at baseline.   Psychiatric:         Mood and Affect: Mood normal.         Behavior: Behavior normal.         Thought Content: Thought content normal.         Judgment: Judgment normal.         Problem List Items Addressed This Visit             ICD-10-CM    Hypothyroid E03.9    Relevant Orders    TSH with reflex to Free T4 if abnormal    Hypertension I10     Other Visit Diagnoses         Codes    Fatigue, unspecified type    -  Primary R53.83    Hypercholesterolemia     E78.00    Relevant Medications    atorvastatin (Lipitor) 40 mg tablet    Other Relevant Orders    Comprehensive Metabolic Panel    Lipid Panel    Stage 3a chronic kidney disease (Multi)     N18.31    HEIDI (obstructive sleep apnea)     G47.33    Anxiety     F41.9    Class 2 severe obesity due to excess calories with serious comorbidity and body mass index (BMI) of 36.0 to 36.9 in adult     E66.812, E66.01, Z68.36          Assessment/Plan      Follow up    Patient complains of chest pain left sided radiating to left subscapular region resolved. He describes the pain more as a tightness which worsens with deep breath.  It does resolve when patient rests.  He denied any nausea, diaphoresis, dyspnea  Unable to exercise on treadmill since symptoms.  EKG sinus rhythm 71 new incomplete right bundle branch block (in comparison to EKG from 2023)  Patient had been evaluated by cardiologist in 2023 through [facility] due to family history of CAD.  At that time patient was able to complete a negative stress test.  Cardio following  stress test 9-24 negative   Sleep study recommended / ordered     Hypercholesterolemia  Patient has been eating poorly   Counseled  On Lipitor 40 mg daily no side effects     Hypertension has been ok today took old meds since he didn't have current meds  On lisinopril to 20 mg daily  follow     CKD stage 3a  GFR 58   6-24     Hypothyroidism due to Hashimoto's feels better  On thyroid 50 mcg daily     SHAJI  on therapy no side effects  Feels less stress from work load     Allergic rhinitis stable on therapy no side effects     Diet and exercise reviewed    Prostate none  Colonoscopy none  CT chest lung cancer screening none  immunizations rev'd  BMI 36.3    Check labs before appt     Follow up 3 months

## 2024-10-10 ENCOUNTER — APPOINTMENT (OUTPATIENT)
Dept: SLEEP MEDICINE | Facility: CLINIC | Age: 44
End: 2024-10-10
Payer: COMMERCIAL

## 2024-10-10 VITALS
OXYGEN SATURATION: 96 % | HEART RATE: 67 BPM | DIASTOLIC BLOOD PRESSURE: 58 MMHG | SYSTOLIC BLOOD PRESSURE: 138 MMHG | WEIGHT: 223 LBS | BODY MASS INDEX: 37.11 KG/M2

## 2024-10-10 DIAGNOSIS — G47.33 OSA (OBSTRUCTIVE SLEEP APNEA): Primary | ICD-10-CM

## 2024-10-10 DIAGNOSIS — J35.1 ENLARGED TONSILS: ICD-10-CM

## 2024-10-10 DIAGNOSIS — K13.79 BLOOD IN MOUTH OF UNKNOWN SOURCE: ICD-10-CM

## 2024-10-10 PROCEDURE — 3075F SYST BP GE 130 - 139MM HG: CPT | Performed by: PSYCHIATRY & NEUROLOGY

## 2024-10-10 PROCEDURE — 99215 OFFICE O/P EST HI 40 MIN: CPT | Performed by: PSYCHIATRY & NEUROLOGY

## 2024-10-10 PROCEDURE — 3078F DIAST BP <80 MM HG: CPT | Performed by: PSYCHIATRY & NEUROLOGY

## 2024-10-10 PROCEDURE — 1036F TOBACCO NON-USER: CPT | Performed by: PSYCHIATRY & NEUROLOGY

## 2024-10-10 ASSESSMENT — PATIENT HEALTH QUESTIONNAIRE - PHQ9
2. FEELING DOWN, DEPRESSED OR HOPELESS: NOT AT ALL
1. LITTLE INTEREST OR PLEASURE IN DOING THINGS: NOT AT ALL
SUM OF ALL RESPONSES TO PHQ9 QUESTIONS 1 AND 2: 0

## 2024-10-10 NOTE — PROGRESS NOTES
Patient: Francis Herrera    [id number]  : 1980 -- AGE 44 y.o.    Provider: Rowdy Reynolds MD     [facility]   Service Date: 10/10/2024              [facility]  Follow-up Note      HPI: Francis Herrera is a 44 y.o. male with HTN, allergic rhinitis, hypothyroidism, CKD-3a, incomplete RBBB, HLD, generalized anxiety, and ADHD, who presented on 24 following a recent EKG that showed incomplete RBBB and he reported symptoms of many years of loud snoring, witnessed apneas, very fragmented sleep for the last 3-5 years, excessive daytime sleepiness, fatigue, brain fog, and not infrequently does he wake up with blood in his throat (history notable for daily exposure to chemicals at work). Exam notable for large tonsils (R>L). He was referred to ENT and underwent home sleep testing for possible sleep apnea. He is here today for a follow up visit.     He continues to have very fragmented sleep and daytime sleepiness/fatigue, and has continued to have blood in his mouth some mornings after waking.     He was never contacted for his ENT consult that I ordered previously.    Home sleep study 24: weight 93.9 kg, BMI 35.56. Severe sleep apnea, predominantly obstructive, with some central/mixed apneas. Overall AHI3% 76/h due to 261 hypopneas, 347 obstructive apneas, 45 central apneas, and 154 mixed apneas. Supine AHI3% 107/h, non-supine AHI3% 69/h. Baseline SpO2 92.4%, mean 88%, august 67%, and <=88% for 262.0 minutes (41.3% of study time).    Report and hypnogram were reviewed personally by me today. Reviewed test results in detail with the patient.    Patient very agreeable to treating with CPAP.    Pt interested in seeing ENT regarding his waking up some days with blood in his throat and for possible tonsillectomy (if he fails CPAP).      Patient Active Problem List   Diagnosis    Hypothyroid    Hypertension     Past Medical History:   Diagnosis Date    Anxiety      Hypertension     Hypothyroidism      Past Surgical History:   Procedure Laterality Date    VASECTOMY       Current Outpatient Medications on File Prior to Visit   Medication Sig Dispense Refill    amLODIPine (Norvasc) 10 mg tablet TAKE 1 TABLET BY MOUTH EVERY DAY 90 tablet 0    atorvastatin (Lipitor) 40 mg tablet Take 1 tablet (40 mg) by mouth once daily. 90 tablet 0    buPROPion SR (Wellbutrin SR) 150 mg 12 hr tablet Take 1 tablet (150 mg) by mouth 2 times a day. (Patient taking differently: Take 1 tablet (150 mg) by mouth 2 times a day. Taking 150 mg once daily) 60 tablet 0    fluticasone (Flonase) 50 mcg/actuation nasal spray Administer 1 spray into each nostril once daily. Shake gently. Before first use, prime pump. After use, clean tip and replace cap. 32 mL 0    levothyroxine (Synthroid, Levoxyl) 50 mcg tablet Take 1 tablet (50 mcg) by mouth early in the morning.. 30 tablet 1    lisinopril 20 mg tablet Take 1 tablet (20 mg) by mouth once daily. 90 tablet 0     No current facility-administered medications on file prior to visit.       PHYSICAL EXAMINATION:   Vitals:    10/10/24 1626   BP: 138/58   BP Location: Left arm   Patient Position: Sitting   BP Cuff Size: Adult   Pulse: 67   SpO2: 96%   Weight: 101 kg (223 lb)     Body mass index is 37.11 kg/m².  General: Awake. Alert. Comfortable. No apparent distress.   Speech: Normal.  Comprehension: Normal.  Mood: Stable.  Affect: Appropriate.  Pul:         Normal respiratory effort.   Abd:         Obese  Neuro: Alert, well-oriented. Cranial nerves II-XII grossly normal and symmetric.  Moves all limbs symmetrically with no evidence of significant focal weakness. No abnormal movements noted. Normal gait      ASSESSMENT AND PLAN: Mr. Francis Herrera is a 44 y.o. male with severe sleep apnea that is predominantly obstructive, who has upper airway crowding, and wakes up some days with blood in his mouth - potentially due to a combination of dry mouth/severe HEIDI/chemical  exposure.      #HEIDI  -start treatment with autoCPAP 4-20 cm H2O. DME: [employer]  -consider in-lab PAP titration study, will order if autoCPAP does not adequately treat him    #enlarged tonsils  #blood in mouth of unknown source  -provided pt with central scheduling number for him to call to make ENT appointment        All of the above was discussed with the patient in detail. He voiced an understanding of the above and was agreeable to proceed further as advised.     41 minutes were spent with the patient plus time spent reviewing the chart, updating the chart as needed, and documenting.     FOLLOW UP: approximately 2 months

## 2024-10-15 DIAGNOSIS — F41.9 ANXIETY: ICD-10-CM

## 2024-10-15 DIAGNOSIS — E03.9 HYPOTHYROIDISM, UNSPECIFIED TYPE: ICD-10-CM

## 2024-10-15 RX ORDER — LEVOTHYROXINE SODIUM 50 UG/1
50 TABLET ORAL DAILY
Qty: 90 TABLET | Refills: 0 | Status: SHIPPED | OUTPATIENT
Start: 2024-10-15

## 2024-10-15 RX ORDER — BUPROPION HYDROCHLORIDE 150 MG/1
150 TABLET, EXTENDED RELEASE ORAL DAILY
Qty: 90 TABLET | Refills: 0 | OUTPATIENT
Start: 2024-10-15

## 2024-10-21 DIAGNOSIS — E03.9 HYPOTHYROIDISM, UNSPECIFIED TYPE: ICD-10-CM

## 2024-10-21 DIAGNOSIS — I10 HYPERTENSION, UNSPECIFIED TYPE: ICD-10-CM

## 2024-10-21 DIAGNOSIS — F41.9 ANXIETY: ICD-10-CM

## 2024-10-21 RX ORDER — LISINOPRIL 20 MG/1
20 TABLET ORAL DAILY
Qty: 30 TABLET | Refills: 1 | Status: SHIPPED | OUTPATIENT
Start: 2024-10-21 | End: 2024-10-25 | Stop reason: SDUPTHER

## 2024-10-21 RX ORDER — LEVOTHYROXINE SODIUM 50 UG/1
50 TABLET ORAL DAILY
Qty: 30 TABLET | Refills: 1 | Status: SHIPPED | OUTPATIENT
Start: 2024-10-21 | End: 2024-10-25 | Stop reason: SDUPTHER

## 2024-10-21 RX ORDER — BUPROPION HYDROCHLORIDE 150 MG/1
TABLET, EXTENDED RELEASE ORAL
Qty: 60 TABLET | Refills: 1 | Status: SHIPPED | OUTPATIENT
Start: 2024-10-21

## 2024-10-25 DIAGNOSIS — E03.9 HYPOTHYROIDISM, UNSPECIFIED TYPE: ICD-10-CM

## 2024-10-25 DIAGNOSIS — I10 HYPERTENSION, UNSPECIFIED TYPE: ICD-10-CM

## 2024-10-27 RX ORDER — LEVOTHYROXINE SODIUM 50 UG/1
50 TABLET ORAL DAILY
Qty: 90 TABLET | Refills: 0 | Status: SHIPPED | OUTPATIENT
Start: 2024-10-27 | End: 2025-01-25

## 2024-10-27 RX ORDER — LISINOPRIL 20 MG/1
20 TABLET ORAL DAILY
Qty: 90 TABLET | Refills: 0 | Status: SHIPPED | OUTPATIENT
Start: 2024-10-27

## 2024-11-06 DIAGNOSIS — F41.9 ANXIETY: ICD-10-CM

## 2024-11-07 RX ORDER — BUPROPION HYDROCHLORIDE 150 MG/1
TABLET, EXTENDED RELEASE ORAL
Qty: 180 TABLET | Refills: 0 | Status: SHIPPED | OUTPATIENT
Start: 2024-11-07

## 2024-11-27 ENCOUNTER — APPOINTMENT (OUTPATIENT)
Dept: SLEEP MEDICINE | Facility: CLINIC | Age: 44
End: 2024-11-27
Payer: COMMERCIAL

## 2024-11-27 VITALS
HEART RATE: 69 BPM | OXYGEN SATURATION: 97 % | SYSTOLIC BLOOD PRESSURE: 161 MMHG | WEIGHT: 223.7 LBS | BODY MASS INDEX: 37.23 KG/M2 | DIASTOLIC BLOOD PRESSURE: 99 MMHG

## 2024-11-27 DIAGNOSIS — G47.33 OSA (OBSTRUCTIVE SLEEP APNEA): Primary | ICD-10-CM

## 2024-11-27 DIAGNOSIS — E66.812 CLASS 2 OBESITY DUE TO EXCESS CALORIES WITH BODY MASS INDEX (BMI) OF 36.0 TO 36.9 IN ADULT, UNSPECIFIED WHETHER SERIOUS COMORBIDITY PRESENT: ICD-10-CM

## 2024-11-27 DIAGNOSIS — I10 HYPERTENSION, UNSPECIFIED TYPE: ICD-10-CM

## 2024-11-27 DIAGNOSIS — E66.09 CLASS 2 OBESITY DUE TO EXCESS CALORIES WITH BODY MASS INDEX (BMI) OF 36.0 TO 36.9 IN ADULT, UNSPECIFIED WHETHER SERIOUS COMORBIDITY PRESENT: ICD-10-CM

## 2024-11-27 DIAGNOSIS — J35.1 ENLARGED TONSILS: ICD-10-CM

## 2024-11-27 PROCEDURE — 3077F SYST BP >= 140 MM HG: CPT | Performed by: PSYCHIATRY & NEUROLOGY

## 2024-11-27 PROCEDURE — 99212 OFFICE O/P EST SF 10 MIN: CPT | Performed by: PSYCHIATRY & NEUROLOGY

## 2024-11-27 PROCEDURE — 3080F DIAST BP >= 90 MM HG: CPT | Performed by: PSYCHIATRY & NEUROLOGY

## 2024-11-27 PROCEDURE — 1036F TOBACCO NON-USER: CPT | Performed by: PSYCHIATRY & NEUROLOGY

## 2024-11-27 RX ORDER — MULTIVITAMIN/IRON/FOLIC ACID 18MG-0.4MG
1 TABLET ORAL DAILY
COMMUNITY

## 2024-11-27 RX ORDER — OMEGA-3-ACID ETHYL ESTERS 1 G/1
1 CAPSULE, LIQUID FILLED ORAL 2 TIMES DAILY
COMMUNITY

## 2024-11-27 RX ORDER — BISMUTH SUBSALICYLATE 262 MG
1 TABLET,CHEWABLE ORAL DAILY
COMMUNITY

## 2024-11-27 ASSESSMENT — PATIENT HEALTH QUESTIONNAIRE - PHQ9
1. LITTLE INTEREST OR PLEASURE IN DOING THINGS: NOT AT ALL
SUM OF ALL RESPONSES TO PHQ9 QUESTIONS 1 AND 2: 0
2. FEELING DOWN, DEPRESSED OR HOPELESS: NOT AT ALL

## 2024-11-27 NOTE — PROGRESS NOTES
" Patient: Francis Herrera    [id number]  : 1980 -- AGE 44 y.o.    Provider: Rowdy Reynolds MD     [facility]   Service Date: 2024              [facility]  Follow-up Note          HPI: Francis Herrera is a 44 y.o. male with severe HEIDI recently started on CPAP. HTN, allergic rhinitis, hypothyroidism, CKD-3a, incomplete RBBB, HLD, generalized anxiety, and ADHD, who presented on 24 following a recent EKG that showed incomplete RBBB and he reported symptoms of many years of loud snoring, witnessed apneas, very fragmented sleep for the last 3-5 years, excessive daytime sleepiness, fatigue, brain fog, and not infrequently does he wake up with blood in his throat (history notable for daily exposure to chemicals at work). Exam notable for large tonsils (R>L). He was referred to ENT and underwent home sleep testing for possible sleep apnea. He is here today for a follow up visit.     Doing amazingly well with CPAP. Using nightly (except for a few days when he traveled to [place]).    PAP DEVICE   DME: [employer]  Setup date: 10/23/24  Type: CPAP  Settin-20 cm H2O  Finding benefit: yes - life-changing - waking up feeling refreshed, no more \"feeling like a zombie in the day\", no more anxiety or depression, no more waking up with back pain, no more coughing up blood    Plans to see ENT in .    No snoring, pressure intolerance, air hunger, aerophagia with CPAP.    No bothersome daytime sleepiness. Will be starting to work-out again and wants to lose 40 lbs.    He did not take his BP meds today.    MASK  Type: hybrid FFM (with nasal pillows cushions)  Fit: good  Nasal congestion: no  Mouth dryness: no - better since sleeping with bruxism guard, which helps keep his mouth closed  Patient is keeping the equipment clean and supplies are being renewed at appropriate intervals.     Prior Sleep studies:   Home sleep study 24: weight 93.9 kg, BMI 35.56. Severe " sleep apnea, predominantly obstructive, with some central/mixed apneas. Overall AHI3% 76/h due to 261 hypopneas, 347 obstructive apneas, 45 central apneas, and 154 mixed apneas. Supine AHI3% 107/h, non-supine AHI3% 69/h. Baseline SpO2 92.4%, mean 88%, august 67%, and <=88% for 262.0 minutes (41.3% of study time).             REVIEW OF MACHINE DOWNLOAD:       Patient Active Problem List   Diagnosis    Hypothyroid    Hypertension    HEIDI (obstructive sleep apnea)    Enlarged tonsils    Class 2 obesity due to excess calories with body mass index (BMI) of 36.0 to 36.9 in adult     Past Medical History:   Diagnosis Date    Anxiety     Hypertension     Hypothyroidism      Past Surgical History:   Procedure Laterality Date    VASECTOMY       Current Outpatient Medications on File Prior to Visit   Medication Sig Dispense Refill    amLODIPine (Norvasc) 10 mg tablet TAKE 1 TABLET BY MOUTH EVERY DAY 90 tablet 0    atorvastatin (Lipitor) 40 mg tablet Take 1 tablet (40 mg) by mouth once daily. 90 tablet 0    b complex 0.4 mg tablet Take 1 tablet by mouth once daily.      buPROPion SR (Wellbutrin SR) 150 mg 12 hr tablet TAKE 1 TABLET BY MOUTH TWICE A DAY (Patient taking differently: Take 1 tablet (150 mg) by mouth once daily. TAKE 1 TABLET BY MOUTH TWICE A DAY) 180 tablet 0    cholecalciferol, vitamin D3, (VITAMIN D3 ORAL) Take 2,000 Units by mouth once daily.      flaxseed oiL oil       fluticasone (Flonase) 50 mcg/actuation nasal spray Administer 1 spray into each nostril once daily. Shake gently. Before first use, prime pump. After use, clean tip and replace cap. 32 mL 0    levothyroxine (Synthroid, Levoxyl) 50 mcg tablet Take 1 tablet (50 mcg) by mouth early in the morning.. 90 tablet 0    lisinopril 20 mg tablet Take 1 tablet (20 mg) by mouth once daily. 90 tablet 0    MAGNESIUM ORAL Take by mouth.      multivitamin tablet Take 1 tablet by mouth once daily.      omega-3/dha/epa/fish oil (OMEGA-3 ORAL) Take by mouth.       omega-3 acid ethyl esters (Lovaza) 1 gram capsule Take 1 capsule (1 g) by mouth 2 times a day.       No current facility-administered medications on file prior to visit.       PHYSICAL EXAMINATION:   Vitals:    11/27/24 1626   BP: (!) 161/99   BP Location: Left arm   Patient Position: Sitting   BP Cuff Size: Adult   Pulse: 69   SpO2: 97%   Weight: 101 kg (223 lb 11.2 oz)     Body mass index is 37.23 kg/m².  General: Awake. Alert. Comfortable. No apparent distress.   Speech: Normal.  Comprehension: Normal.  Mood: Stable.  Affect: Appropriate.  Pul:         Normal respiratory effort.   Abd:         Obese  Neuro: Alert, well-oriented. Cranial nerves II-XII grossly normal and symmetric.  Moves all limbs symmetrically with no evidence of significant focal weakness. No abnormal movements noted. Normal gait      ASSESSMENT AND PLAN: Mr. Francis Herrera is a 44 y.o. male with:      #HEIDI - Patient's sleep apnea is under very good control with CPAP, he is deriving significant subjective benefit from treatment, his compliance is excellent, and mask leak is well controlled overall.  -continue current CPAP settings      #enlarged tonsils  -pt to see ENT    #HTN - BP elevated today  -pt to take BP meds when he gets home    #obesity  -pt motivated to exercise and lose weight    All of the above was discussed with the patient in detail. He voiced an understanding of the above and was agreeable to proceed further as advised.     17 minutes were spent with the patient plus time spent reviewing the chart, updating the chart as needed, and documenting.     FOLLOW UP: 4-5 months

## 2024-12-09 ENCOUNTER — APPOINTMENT (OUTPATIENT)
Dept: PRIMARY CARE | Facility: CLINIC | Age: 44
End: 2024-12-09
Payer: COMMERCIAL

## 2024-12-23 DIAGNOSIS — I10 HYPERTENSION, UNSPECIFIED TYPE: ICD-10-CM

## 2024-12-23 RX ORDER — LISINOPRIL 20 MG/1
20 TABLET ORAL DAILY
Qty: 30 TABLET | Refills: 0 | Status: SHIPPED | OUTPATIENT
Start: 2024-12-23

## 2025-02-05 DIAGNOSIS — E03.9 HYPOTHYROIDISM, UNSPECIFIED TYPE: ICD-10-CM

## 2025-02-05 RX ORDER — LEVOTHYROXINE SODIUM 50 UG/1
TABLET ORAL
Qty: 30 TABLET | Refills: 0 | Status: SHIPPED | OUTPATIENT
Start: 2025-02-05 | End: 2025-03-07

## 2025-02-06 DIAGNOSIS — I10 HYPERTENSION, UNSPECIFIED TYPE: ICD-10-CM

## 2025-02-06 RX ORDER — LISINOPRIL 20 MG/1
20 TABLET ORAL DAILY
Qty: 15 TABLET | Refills: 0 | OUTPATIENT
Start: 2025-02-06

## 2025-02-12 DIAGNOSIS — E78.00 HYPERCHOLESTEROLEMIA: ICD-10-CM

## 2025-02-16 RX ORDER — ATORVASTATIN CALCIUM 40 MG/1
40 TABLET, FILM COATED ORAL DAILY
Qty: 30 TABLET | Refills: 0 | OUTPATIENT
Start: 2025-02-16

## 2025-02-18 DIAGNOSIS — E78.00 HYPERCHOLESTEROLEMIA: ICD-10-CM

## 2025-02-18 RX ORDER — ATORVASTATIN CALCIUM 40 MG/1
40 TABLET, FILM COATED ORAL DAILY
Qty: 90 TABLET | Refills: 0 | OUTPATIENT
Start: 2025-02-18

## 2025-02-19 ENCOUNTER — APPOINTMENT (OUTPATIENT)
Dept: PRIMARY CARE | Facility: CLINIC | Age: 45
End: 2025-02-19
Payer: COMMERCIAL

## 2025-04-17 ENCOUNTER — APPOINTMENT (OUTPATIENT)
Dept: SLEEP MEDICINE | Facility: CLINIC | Age: 45
End: 2025-04-17
Payer: COMMERCIAL

## 2025-06-11 ENCOUNTER — APPOINTMENT (OUTPATIENT)
Dept: SLEEP MEDICINE | Facility: CLINIC | Age: 45
End: 2025-06-11
Payer: COMMERCIAL